# Patient Record
Sex: MALE | Employment: OTHER | URBAN - METROPOLITAN AREA
[De-identification: names, ages, dates, MRNs, and addresses within clinical notes are randomized per-mention and may not be internally consistent; named-entity substitution may affect disease eponyms.]

---

## 2021-07-13 ENCOUNTER — NEW PATIENT (OUTPATIENT)
Dept: URBAN - METROPOLITAN AREA CLINIC 11 | Facility: CLINIC | Age: 71
End: 2021-07-13

## 2021-07-13 DIAGNOSIS — H43.813: ICD-10-CM

## 2021-07-13 DIAGNOSIS — H33.311: ICD-10-CM

## 2021-07-13 DIAGNOSIS — H35.372: ICD-10-CM

## 2021-07-13 PROCEDURE — 92134 CPTRZ OPH DX IMG PST SGM RTA: CPT

## 2021-07-13 PROCEDURE — 99204 OFFICE O/P NEW MOD 45 MIN: CPT

## 2021-07-13 PROCEDURE — 92201 OPSCPY EXTND RTA DRAW UNI/BI: CPT

## 2021-07-13 ASSESSMENT — VISUAL ACUITY
OS_SC: 20/100-1
OD_SC: 20/40

## 2021-07-13 ASSESSMENT — TONOMETRY
OS_IOP_MMHG: 19
OD_IOP_MMHG: 18

## 2021-07-13 NOTE — PATIENT DISCUSSION
We discussed EMM with patient.  The membrane is visually significant and patient is symptomatic.  I recommended PPV,MP in the left eye after he has his cataract surgery.

## 2021-07-13 NOTE — PATIENT DISCUSSION
40 minutes was spent in face to face dialogue with patient.  I recommended that before he has cataract surgery in the right eye, he have focal laser treatment for the Horseshoe tear.  He is cleared for cataract surgery after that. I advised him that after he has cataract surgery in both eyes, I will plan PPV,MP in his left eye.

## 2021-08-12 ENCOUNTER — TREATMENT ONLY (OUTPATIENT)
Dept: URBAN - METROPOLITAN AREA CLINIC 11 | Facility: CLINIC | Age: 71
End: 2021-08-12

## 2021-08-12 DIAGNOSIS — H33.311: ICD-10-CM

## 2021-08-12 PROCEDURE — 67145 PROPH RTA DTCHMNT PC: CPT

## 2021-08-12 ASSESSMENT — VISUAL ACUITY
OS_SC: 20/100
OD_SC: 20/50-1

## 2021-08-12 ASSESSMENT — TONOMETRY: OD_IOP_MMHG: 18

## 2021-08-12 NOTE — PROCEDURE NOTE: CLINICAL
PROCEDURE NOTE: Focal Laser, Retina OD. Diagnosis: Horseshoe Tear of Retina Without Detachment. Anesthesia: Topical. Prior to focal laser, risks/benefits/alternatives to laser discussed including loss of vision and patient wished to proceed. Spot size: 200 um. Power: 230 mW. Number of pulses: 159. Patient tolerated procedure well. There were no complications. Post procedure instructions given. Maria Fernanda Hernandez

## 2021-08-12 NOTE — PATIENT DISCUSSION
Focal laser done today, right eye.  Patient tolerated procedure fine and was instructed to call us if any pain, decrease or loss of vision or flashing lights.

## 2021-08-26 ENCOUNTER — POST-OP CHECK (OUTPATIENT)
Dept: URBAN - METROPOLITAN AREA CLINIC 11 | Facility: CLINIC | Age: 71
End: 2021-08-26

## 2021-08-26 DIAGNOSIS — H33.311: ICD-10-CM

## 2021-08-26 PROCEDURE — 99024 POSTOP FOLLOW-UP VISIT: CPT

## 2021-08-26 ASSESSMENT — VISUAL ACUITY
OD_SC: 20/60
OD_PH: 20/30-1
OS_PH: 20/50
OS_SC: 20/200

## 2021-08-26 ASSESSMENT — TONOMETRY: OD_IOP_MMHG: 19

## 2021-08-26 NOTE — PATIENT DISCUSSION
Mr. Shazia Nunez retina is attached. The laser marks are not as pigmented as I would like. I will see him in 3 weeks for re-evaluation with the possibility of supplemental laser, if needed.

## 2021-09-21 ENCOUNTER — POST-OP CHECK (OUTPATIENT)
Dept: URBAN - METROPOLITAN AREA CLINIC 11 | Facility: CLINIC | Age: 71
End: 2021-09-21

## 2021-09-21 DIAGNOSIS — H33.311: ICD-10-CM

## 2021-09-21 PROCEDURE — 99024 POSTOP FOLLOW-UP VISIT: CPT

## 2021-09-21 PROCEDURE — 67145 PROPH RTA DTCHMNT PC: CPT

## 2021-09-21 ASSESSMENT — TONOMETRY: OD_IOP_MMHG: 10

## 2021-09-21 ASSESSMENT — VISUAL ACUITY
OD_SC: 20/40
OS_PH: 20/50
OS_SC: 20/200

## 2021-09-21 NOTE — PROCEDURE NOTE: CLINICAL
PROCEDURE NOTE: Laser for Retinal Tear OD. Diagnosis: Horseshoe Tear of Retina Without Detachment. Anesthesia: Topical. Prior to laser, risks/benefits/alternatives to laser discussed including loss of vision, decreased peripheral and night vision, need for more laser and/or surgery and patient wished to proceed. Spot size: * um. Pulse power: * mW. Number of pulses: *. Patient tolerated procedure well. There were no complications. Post-op instructions given. Figueroa Griggs

## 2021-10-26 ENCOUNTER — POST-OP CHECK (OUTPATIENT)
Dept: URBAN - METROPOLITAN AREA CLINIC 11 | Facility: CLINIC | Age: 71
End: 2021-10-26

## 2021-10-26 DIAGNOSIS — H33.311: ICD-10-CM

## 2021-10-26 PROCEDURE — 99024 POSTOP FOLLOW-UP VISIT: CPT

## 2021-10-26 ASSESSMENT — VISUAL ACUITY
OS_SC: 20/200
OS_PH: 20/40
OD_SC: 20/40-1

## 2021-10-26 ASSESSMENT — TONOMETRY: OD_IOP_MMHG: 13

## 2021-10-26 NOTE — PATIENT DISCUSSION
His retina is attached and there are no new holes or tears on careful exam. He is cleared to have cataract surgery in both eyes. I will see him around 1 month following his cataract surgery for re-evaluation.

## 2022-07-06 RX ORDER — ACYCLOVIR 400 MG/1
TABLET ORAL
COMMUNITY
End: 2022-10-24

## 2022-07-06 RX ORDER — ALBUTEROL SULFATE 90 UG/1
AEROSOL, METERED RESPIRATORY (INHALATION)
COMMUNITY
End: 2022-11-03 | Stop reason: SDUPTHER

## 2022-07-06 RX ORDER — FINASTERIDE 5 MG/1
TABLET, FILM COATED ORAL
COMMUNITY
Start: 2021-10-26 | End: 2022-11-03 | Stop reason: SDUPTHER

## 2022-07-06 RX ORDER — ROSUVASTATIN CALCIUM 20 MG/1
TABLET, COATED ORAL
COMMUNITY
End: 2022-10-24

## 2022-07-06 RX ORDER — MOMETASONE FUROATE 200 UG/1
AEROSOL RESPIRATORY (INHALATION)
COMMUNITY
End: 2022-11-03

## 2022-08-09 ENCOUNTER — ESTABLISHED PATIENT (OUTPATIENT)
Dept: URBAN - METROPOLITAN AREA CLINIC 11 | Facility: CLINIC | Age: 72
End: 2022-08-09

## 2022-08-09 DIAGNOSIS — H43.813: ICD-10-CM

## 2022-08-09 DIAGNOSIS — H25.11: ICD-10-CM

## 2022-08-09 DIAGNOSIS — H35.372: ICD-10-CM

## 2022-08-09 PROCEDURE — 92134 CPTRZ OPH DX IMG PST SGM RTA: CPT

## 2022-08-09 PROCEDURE — 92201 OPSCPY EXTND RTA DRAW UNI/BI: CPT

## 2022-08-09 PROCEDURE — 99214 OFFICE O/P EST MOD 30 MIN: CPT

## 2022-08-09 ASSESSMENT — VISUAL ACUITY
OS_SC: 20/40-2
OD_SC: 20/50-2
OD_PH: 20/30+2

## 2022-08-09 ASSESSMENT — TONOMETRY
OS_IOP_MMHG: 12
OD_IOP_MMHG: 9

## 2022-08-09 NOTE — PATIENT DISCUSSION
We have revisited going through with a PPV/MP in the left eye. After full discussion of the procedure, risks and benefits he has agreed to proceed. My surgery scheduler will assist him in setting up that appointment.

## 2022-08-31 ENCOUNTER — SURGERY/PROCEDURE (OUTPATIENT)
Dept: URBAN - METROPOLITAN AREA EYE CENTER 1 | Facility: EYE CENTER | Age: 72
End: 2022-08-31

## 2022-08-31 DIAGNOSIS — H35.372: ICD-10-CM

## 2022-08-31 PROCEDURE — 67042 VIT FOR MACULAR HOLE: CPT

## 2022-09-01 ENCOUNTER — POST-OP (OUTPATIENT)
Dept: URBAN - METROPOLITAN AREA CLINIC 11 | Facility: CLINIC | Age: 72
End: 2022-09-01

## 2022-09-01 DIAGNOSIS — H35.372: ICD-10-CM

## 2022-09-01 PROCEDURE — 99024 POSTOP FOLLOW-UP VISIT: CPT

## 2022-09-01 ASSESSMENT — TONOMETRY
OS_IOP_MMHG: 9
OD_IOP_MMHG: 16

## 2022-09-01 ASSESSMENT — VISUAL ACUITY
OD_SC: 20/50+1
OD_PH: 20/25-2
OS_SC: 20/40+1

## 2022-09-01 NOTE — PATIENT DISCUSSION
Good one day post op appearance.  Post op instructions were given and understood by the patient and will begin Prednisolone and Ofloxacin TID for one week until his next exam.

## 2022-09-08 ENCOUNTER — POST-OP (OUTPATIENT)
Dept: URBAN - METROPOLITAN AREA CLINIC 11 | Facility: CLINIC | Age: 72
End: 2022-09-08

## 2022-09-08 DIAGNOSIS — H35.372: ICD-10-CM

## 2022-09-08 PROCEDURE — 99024 POSTOP FOLLOW-UP VISIT: CPT

## 2022-09-08 ASSESSMENT — VISUAL ACUITY
OD_PH: 20/30-2
OS_SC: 20/40+2
OD_SC: 20/60-2

## 2022-09-08 ASSESSMENT — TONOMETRY
OS_IOP_MMHG: 14
OD_IOP_MMHG: 10

## 2022-09-08 NOTE — PATIENT DISCUSSION
Good one week post op appearance, I reassured him that his retina is healing as it should and the spots he sees will eventually die down. Patient will change Prednisolone to BID until next exam in a month and discontinue the Ofloxacin.

## 2022-10-11 ENCOUNTER — POST-OP (OUTPATIENT)
Dept: URBAN - METROPOLITAN AREA CLINIC 11 | Facility: CLINIC | Age: 72
End: 2022-10-11

## 2022-10-11 DIAGNOSIS — H35.372: ICD-10-CM

## 2022-10-11 PROCEDURE — 99024 POSTOP FOLLOW-UP VISIT: CPT

## 2022-10-11 PROCEDURE — 92134 CPTRZ OPH DX IMG PST SGM RTA: CPT

## 2022-10-11 ASSESSMENT — VISUAL ACUITY
OD_PH: 20/30-2
OD_SC: 20/50+2
OS_SC: 20/25-2

## 2022-10-11 ASSESSMENT — TONOMETRY
OD_IOP_MMHG: 17
OS_IOP_MMHG: 31

## 2022-10-11 NOTE — PATIENT DISCUSSION
Good one month post op appearance.  Continue Prednisolone once daily for 1 week then stop.  IOP is elevated today recommend starting Alphagan BID until next visit in 2 wks rx sent erx.

## 2022-10-24 PROBLEM — N18.30 STAGE 3 CHRONIC KIDNEY DISEASE (HCC): Status: ACTIVE | Noted: 2022-10-24

## 2022-10-24 PROBLEM — Z97.3 WEARS EYEGLASSES: Status: ACTIVE | Noted: 2022-10-24

## 2022-10-24 PROBLEM — M16.10 OSTEOARTHRITIS OF PELVIS: Status: ACTIVE | Noted: 2022-10-24

## 2022-10-24 PROBLEM — M25.9 DISORDER OF SHOULDER: Status: ACTIVE | Noted: 2022-10-24

## 2022-10-24 PROBLEM — Z86.010 HISTORY OF COLONIC POLYPS: Status: ACTIVE | Noted: 2022-10-24

## 2022-10-24 PROBLEM — T14.8XXA MUSCLE TEAR: Status: ACTIVE | Noted: 2022-10-24

## 2022-10-24 PROBLEM — M24.559 CONTRACTURE OF HIP JOINT: Status: ACTIVE | Noted: 2022-10-24

## 2022-10-24 PROBLEM — M77.00 MEDIAL EPICONDYLITIS OF ELBOW: Status: ACTIVE | Noted: 2022-10-24

## 2022-10-24 PROBLEM — J45.909 CHILDHOOD ASTHMA: Status: ACTIVE | Noted: 2022-10-24

## 2022-10-24 PROBLEM — E78.5 HYPERLIPIDEMIA: Status: ACTIVE | Noted: 2022-10-24

## 2022-10-25 ENCOUNTER — POST-OP (OUTPATIENT)
Dept: URBAN - METROPOLITAN AREA CLINIC 11 | Facility: CLINIC | Age: 72
End: 2022-10-25

## 2022-10-25 DIAGNOSIS — H35.372: ICD-10-CM

## 2022-10-25 PROCEDURE — 99024 POSTOP FOLLOW-UP VISIT: CPT

## 2022-10-25 ASSESSMENT — TONOMETRY
OS_IOP_MMHG: 19
OD_IOP_MMHG: 17

## 2022-10-25 ASSESSMENT — VISUAL ACUITY
OS_SC: 20/30-2
OD_PH: 20/40+1
OD_SC: 20/50-2

## 2022-11-03 PROBLEM — N18.30 STAGE 3 CHRONIC KIDNEY DISEASE (HCC): Status: RESOLVED | Noted: 2022-10-24 | Resolved: 2022-11-03

## 2023-01-03 ENCOUNTER — FOLLOW UP (OUTPATIENT)
Dept: URBAN - METROPOLITAN AREA CLINIC 11 | Facility: CLINIC | Age: 73
End: 2023-01-03

## 2023-01-03 DIAGNOSIS — H40.013: ICD-10-CM

## 2023-01-03 DIAGNOSIS — Z96.1: ICD-10-CM

## 2023-01-03 DIAGNOSIS — H35.371: ICD-10-CM

## 2023-01-03 DIAGNOSIS — H43.811: ICD-10-CM

## 2023-01-03 DIAGNOSIS — H25.11: ICD-10-CM

## 2023-01-03 PROCEDURE — 99214 OFFICE O/P EST MOD 30 MIN: CPT

## 2023-01-03 PROCEDURE — 92134 CPTRZ OPH DX IMG PST SGM RTA: CPT

## 2023-01-03 ASSESSMENT — VISUAL ACUITY
OS_SC: 20/30-2
OD_SC: 20/50
OD_PH: 20/30-1

## 2023-01-03 ASSESSMENT — TONOMETRY
OS_IOP_MMHG: 23
OD_IOP_MMHG: 20
OS_IOP_MMHG: 24

## 2023-01-03 NOTE — PATIENT DISCUSSION
Advised patient to continue annual exams with Dr. Becca Jordan for general eyecare with updated refraction.

## 2023-01-03 NOTE — PATIENT DISCUSSION
Elevated IOP today OS>OD.  Recommend patient start Alphagan BID OS until follow up with Dr. Alda Avery sent erx).

## 2023-01-03 NOTE — PATIENT DISCUSSION
Advised patient to continue annual exams with Dr. Karen Tomlinson for general eyecare with updated refraction.